# Patient Record
Sex: MALE | Race: WHITE | URBAN - METROPOLITAN AREA
[De-identification: names, ages, dates, MRNs, and addresses within clinical notes are randomized per-mention and may not be internally consistent; named-entity substitution may affect disease eponyms.]

---

## 2019-03-01 DIAGNOSIS — Z84.81 FAMILY HISTORY OF GENETIC DISEASE CARRIER: Primary | ICD-10-CM

## 2019-03-01 DIAGNOSIS — Z84.81 FAMILY HISTORY OF CARRIER OF GENETIC DISEASE: Primary | ICD-10-CM

## 2019-03-01 LAB — ALP SERPL-CCNC: 40 U/L (ref 40–150)

## 2019-03-01 PROCEDURE — 36415 COLL VENOUS BLD VENIPUNCTURE: CPT | Performed by: PEDIATRICS

## 2019-03-01 PROCEDURE — 84075 ASSAY ALKALINE PHOSPHATASE: CPT | Performed by: PEDIATRICS

## 2019-03-01 PROCEDURE — 84207 ASSAY OF VITAMIN B-6: CPT | Performed by: PEDIATRICS

## 2019-03-04 LAB — VIT B6 SERPL-MCNC: 106.1 NMOL/L (ref 20–125)

## 2019-04-01 DIAGNOSIS — Z13.71 SCREENING FOR GENETIC DISEASE CARRIER STATUS: Primary | ICD-10-CM

## 2019-04-09 ENCOUNTER — CARE COORDINATION (OUTPATIENT)
Dept: ENDOCRINOLOGY | Facility: CLINIC | Age: 39
End: 2019-04-09

## 2019-04-09 NOTE — PROGRESS NOTES
Writer followed up with patient on behalf of Dr. Reese, Brayan's son who is a patient of Dr. Reese recommended testing of family members to rule out hypophosphatasia, a detailed voicemail message was left on Brayan's voicemail with the following recommendations on behalf of Physician:     Alkaline phosphatase is at the lower end of normal. Vitamin B6 is normal. We will ask for urine phopshoethanolamine, which sometimes can be elevated in the presence of normal B6 in patients with hypophosphatasia. Results letter in Epic.     Writer alerted patient that lab orders have been placed and can be done, including a urine sample at any [facility] location that is convenient for him.     Contact information was left to call with any further questions or concerns in follow up to lab results.     Mariposa CORDOBA, RN, PHN  Nurse Care Coordinator, [facility], [facility]  Phone: [phone]  Fax: [phone]